# Patient Record
Sex: FEMALE | Race: WHITE
[De-identification: names, ages, dates, MRNs, and addresses within clinical notes are randomized per-mention and may not be internally consistent; named-entity substitution may affect disease eponyms.]

---

## 2018-03-17 ENCOUNTER — HOSPITAL ENCOUNTER (EMERGENCY)
Dept: HOSPITAL 17 - PHEFT | Age: 78
Discharge: HOME | End: 2018-03-17
Payer: MEDICARE

## 2018-03-17 VITALS
HEART RATE: 70 BPM | RESPIRATION RATE: 16 BRPM | OXYGEN SATURATION: 96 % | SYSTOLIC BLOOD PRESSURE: 171 MMHG | DIASTOLIC BLOOD PRESSURE: 85 MMHG | TEMPERATURE: 97.5 F

## 2018-03-17 VITALS — WEIGHT: 127.87 LBS | BODY MASS INDEX: 22.66 KG/M2 | HEIGHT: 63 IN

## 2018-03-17 DIAGNOSIS — Z79.01: ICD-10-CM

## 2018-03-17 DIAGNOSIS — E78.00: ICD-10-CM

## 2018-03-17 DIAGNOSIS — I48.91: ICD-10-CM
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
## 2018-03-17 DIAGNOSIS — Z87.09: ICD-10-CM

## 2018-03-17 DIAGNOSIS — R11.0: ICD-10-CM

## 2018-03-17 DIAGNOSIS — S60.562A: Primary | ICD-10-CM

## 2018-03-17 DIAGNOSIS — Z23: ICD-10-CM

## 2018-03-17 DIAGNOSIS — Z86.69: ICD-10-CM

## 2018-03-17 PROCEDURE — 90714 TD VACC NO PRESV 7 YRS+ IM: CPT

## 2018-03-17 PROCEDURE — 90471 IMMUNIZATION ADMIN: CPT

## 2018-03-17 NOTE — PD
HPI


Chief Complaint:  Bite or Sting


Time Seen by Provider:  13:04


Travel History


International Travel<30 days:  No


Contact w/Intl Traveler<30days:  No


Traveled to known affect area:  No





History of Present Illness


HPI


77-year-old female presents to the emergency department for evaluation of left 

thumb pain that started yesterday.  She states that she was bit by something 

and thinks it was a scorpion.  However, she did not see the scorpion.  The 

patient reports localized pain to her left thumb, worse with movement.  Patient 

states she has associated nausea, but no vomiting.  She denies any fevers or 

chills.  No abdominal pain.  She ate toast and a bagel for breakfast with 

coffee was no issue.  Patient states it hurts to move her thumb.  She has no 

erythema or swelling.  Mild severity.  She states her tetanus immunization is 

not up-to-date.





PFSH


Past Medical History


Hx Anticoagulant Therapy:  Yes (WARFARIN)


Asthma:  Yes


Atrial Fibrillation:  Yes


Cardiovascular Problems:  Yes (A. FIB, CHOL)


High Cholesterol:  Yes


Diabetes:  No


Neurologic:  Yes (tremors)


Respiratory:  Yes (ASTHMA)


Pregnant?:  Not Pregnant





Past Surgical History


Tonsillectomy:  Yes


Other Surgery:  Yes (skin ca removed)





Social History


Alcohol Use:  No


Tobacco Use:  No


Substance Use:  No





Allergies-Medications


(Allergen,Severity, Reaction):  


Coded Allergies:  


     iodine (Verified  Allergy, Severe, 3/17/18)





Review of Systems


Except as stated in HPI:  all other systems reviewed are Neg





Physical Exam


Narrative


GENERAL: Well-nourished, well-developed female patient, afebrile.


SKIN: Focused skin assessment warm/dry.  No noticeable injury to left thumb.  

No erythema or swelling.  She has full active and passive range of motion of 

the left thumb.


HEAD: Normocephalic.  Atraumatic.


EYES: No scleral icterus. No injection or drainage. 


NECK: Supple, trachea midline. No JVD or lymphadenopathy.


CARDIOVASCULAR: Regular rate and rhythm without murmurs, gallops, or rubs. 


RESPIRATORY: Breath sounds equal bilaterally. No accessory muscle use.  Lungs 

sounds are clear to auscultation.


GASTROINTESTINAL: Abdomen soft, non-tender, nondistended.  


MUSCULOSKELETAL: No cyanosis, or edema. 


BACK: Nontender without obvious deformity. No CVA tenderness.





Data


Data


Last Documented VS





Vital Signs








  Date Time  Temp Pulse Resp B/P (MAP) Pulse Ox O2 Delivery O2 Flow Rate FiO2


 


3/17/18 12:39 97.5 70 16 171/85 (113) 96   








Orders





 Orders


Tetanus/Diphtheria Tox Adult (Tetanus/Di (3/17/18 13:30)








MDM


Medical Decision Making


Medical Screen Exam Complete:  Yes


Emergency Medical Condition:  Yes


Medical Record Reviewed:  Yes


Differential Diagnosis


Insect bite versus scorpion sting versus thumb sprain


Narrative Course


77-year-old female presents to the emergency department stating she believes 

she was done by a scorpion to her left thumb.  Patient appears well on exam.  

No evidence of infection.  She has full range of motion of the left thumb.  She 

denies any injury.  Her tetanus immunization is updated.  Patient started 

taking over-the-counter Tylenol and ice it for pain.  She is to follow-up with 

a primary care physician or return here for any acute worsening of symptoms.





The patient was discharged in stable condition with instructions, including 

return instructions and follow up instructions.





Diagnosis





 Primary Impression:  


 Insect bite hand


 Qualified Codes:  S60.562A - Insect bite (nonvenomous) of left hand, initial 

encounter; W57.XXXA - Bitten or stung by nonvenomous insect and other 

nonvenomous arthropods, initial encounter


Referrals:  


Primary Care Physician


call for appointment


Patient Instructions:  General Instructions, Insect Bite or Sting (ED)





***Additional Instructions:  


Over-the-counter Tylenol every 4 hours as needed for pain.


Ice for 20 minutes 4-5 times daily.


Follow-up with your primary care physician.


Return to the emergency department for any acute worsening of symptoms.


***Med/Other Pt SpecificInfo:  No Change to Meds


Disposition:  01 DISCHARGE HOME


Condition:  Stable











Gricel Ronquillo Mar 17, 2018 13:22